# Patient Record
Sex: FEMALE | Race: WHITE | NOT HISPANIC OR LATINO | URBAN - METROPOLITAN AREA
[De-identification: names, ages, dates, MRNs, and addresses within clinical notes are randomized per-mention and may not be internally consistent; named-entity substitution may affect disease eponyms.]

---

## 2017-01-03 ENCOUNTER — CHARTING TRANS (OUTPATIENT)
Dept: URGENT CARE | Age: 52
End: 2017-01-03

## 2017-01-03 ASSESSMENT — PAIN SCALES - GENERAL: PAINLEVEL_OUTOF10: 5

## 2017-01-04 ENCOUNTER — CHARTING TRANS (OUTPATIENT)
Dept: OTHER | Age: 52
End: 2017-01-04

## 2017-05-27 ENCOUNTER — LAB SERVICES (OUTPATIENT)
Dept: OTHER | Age: 52
End: 2017-05-27

## 2017-05-27 ENCOUNTER — CHARTING TRANS (OUTPATIENT)
Dept: URGENT CARE | Age: 52
End: 2017-05-27

## 2017-05-27 LAB
CLARITY: ABNORMAL
COLOR: ABNORMAL
SPECIFIC GRAVITY URINE: >=1.03 (ref 1–1.03)

## 2017-05-30 LAB — FINAL REPORT: NORMAL

## 2018-01-24 ENCOUNTER — PRIOR ORIGINAL RECORDS (OUTPATIENT)
Dept: OTHER | Age: 53
End: 2018-01-24

## 2018-02-14 ENCOUNTER — PRIOR ORIGINAL RECORDS (OUTPATIENT)
Dept: OTHER | Age: 53
End: 2018-02-14

## 2018-03-13 ENCOUNTER — PRIOR ORIGINAL RECORDS (OUTPATIENT)
Dept: OTHER | Age: 53
End: 2018-03-13

## 2018-03-26 ENCOUNTER — MYAURORA ACCOUNT LINK (OUTPATIENT)
Dept: OTHER | Age: 53
End: 2018-03-26

## 2018-05-14 ENCOUNTER — LAB SERVICES (OUTPATIENT)
Dept: OTHER | Age: 53
End: 2018-05-14

## 2018-05-14 ENCOUNTER — CHARTING TRANS (OUTPATIENT)
Dept: OTHER | Age: 53
End: 2018-05-14

## 2018-05-14 ENCOUNTER — ANCILLARY ORDERS (OUTPATIENT)
Dept: OTHER | Age: 53
End: 2018-05-14

## 2018-05-14 DIAGNOSIS — Z12.31 ENCOUNTER FOR SCREENING MAMMOGRAM FOR MALIGNANT NEOPLASM OF BREAST: ICD-10-CM

## 2018-05-17 LAB — AP REPORT: NORMAL

## 2018-11-28 VITALS
SYSTOLIC BLOOD PRESSURE: 106 MMHG | HEART RATE: 70 BPM | TEMPERATURE: 98.8 F | DIASTOLIC BLOOD PRESSURE: 70 MMHG | OXYGEN SATURATION: 98 % | RESPIRATION RATE: 16 BRPM

## 2018-11-29 VITALS
RESPIRATION RATE: 20 BRPM | SYSTOLIC BLOOD PRESSURE: 118 MMHG | HEART RATE: 84 BPM | TEMPERATURE: 99.1 F | OXYGEN SATURATION: 99 % | DIASTOLIC BLOOD PRESSURE: 80 MMHG

## 2019-02-28 VITALS
SYSTOLIC BLOOD PRESSURE: 102 MMHG | RESPIRATION RATE: 16 BRPM | BODY MASS INDEX: 18.51 KG/M2 | HEART RATE: 66 BPM | DIASTOLIC BLOOD PRESSURE: 60 MMHG | HEIGHT: 69 IN | WEIGHT: 125 LBS

## 2019-03-06 VITALS
DIASTOLIC BLOOD PRESSURE: 80 MMHG | SYSTOLIC BLOOD PRESSURE: 110 MMHG | BODY MASS INDEX: 18.66 KG/M2 | WEIGHT: 126 LBS | HEIGHT: 69 IN

## 2019-06-13 ENCOUNTER — WALK IN (OUTPATIENT)
Dept: URGENT CARE | Age: 54
End: 2019-06-13

## 2019-06-13 VITALS
TEMPERATURE: 97.5 F | DIASTOLIC BLOOD PRESSURE: 70 MMHG | OXYGEN SATURATION: 98 % | RESPIRATION RATE: 16 BRPM | HEART RATE: 64 BPM | SYSTOLIC BLOOD PRESSURE: 108 MMHG

## 2019-06-13 DIAGNOSIS — K13.0 LIP LESION: Primary | ICD-10-CM

## 2019-06-13 PROCEDURE — 99213 OFFICE O/P EST LOW 20 MIN: CPT | Performed by: FAMILY MEDICINE

## 2019-06-13 RX ORDER — CITALOPRAM 20 MG/1
20 TABLET ORAL
COMMUNITY
Start: 2019-02-11 | End: 2022-04-28 | Stop reason: ALTCHOICE

## 2019-06-13 RX ORDER — CEPHALEXIN 500 MG/1
500 CAPSULE ORAL 3 TIMES DAILY
Qty: 30 CAPSULE | Refills: 0 | Status: SHIPPED | OUTPATIENT
Start: 2019-06-13 | End: 2019-06-23

## 2019-10-14 ENCOUNTER — WALK IN (OUTPATIENT)
Dept: URGENT CARE | Age: 54
End: 2019-10-14

## 2019-10-14 VITALS
HEART RATE: 75 BPM | DIASTOLIC BLOOD PRESSURE: 60 MMHG | SYSTOLIC BLOOD PRESSURE: 104 MMHG | OXYGEN SATURATION: 98 % | RESPIRATION RATE: 16 BRPM | TEMPERATURE: 98.9 F

## 2019-10-14 DIAGNOSIS — R30.0 DYSURIA: Primary | ICD-10-CM

## 2019-10-14 LAB
BACTERIA: ABNORMAL
BILIRUBIN URINE: NEGATIVE
BLOOD URINE: ABNORMAL
CLARITY: ABNORMAL
COLOR: YELLOW
GLUCOSE QUALITATIVE U: NEGATIVE
KETONES, URINE: NEGATIVE
LEUKOCYTE ESTERASE URINE: NEGATIVE
NITRITE URINE: NEGATIVE
PH URINE: 5 (ref 5–7)
RED BLOOD CELLS URINE: ABNORMAL (ref 0–3)
SPECIFIC GRAVITY URINE: >=1.03 (ref 1–1.03)
SQUAMOUS EPITHELIAL CELLS: ABNORMAL
URINE PROTEIN, QUAL (DIPSTICK): NEGATIVE
UROBILINOGEN URINE: 0.2
WHITE BLOOD CELLS URINE: ABNORMAL (ref 0–5)
YEAST: ABNORMAL

## 2019-10-14 PROCEDURE — 99214 OFFICE O/P EST MOD 30 MIN: CPT | Performed by: FAMILY MEDICINE

## 2019-10-14 PROCEDURE — 81003 URINALYSIS AUTO W/O SCOPE: CPT | Performed by: FAMILY MEDICINE

## 2019-10-14 RX ORDER — NITROFURANTOIN 25; 75 MG/1; MG/1
100 CAPSULE ORAL 2 TIMES DAILY
Qty: 14 CAPSULE | Refills: 0 | Status: SHIPPED | OUTPATIENT
Start: 2019-10-14 | End: 2019-10-21

## 2022-04-28 ENCOUNTER — OFFICE VISIT (OUTPATIENT)
Dept: OBGYN | Age: 57
End: 2022-04-28

## 2022-04-28 VITALS
DIASTOLIC BLOOD PRESSURE: 66 MMHG | RESPIRATION RATE: 16 BRPM | BODY MASS INDEX: 19.44 KG/M2 | TEMPERATURE: 97.8 F | WEIGHT: 128.25 LBS | HEIGHT: 68 IN | SYSTOLIC BLOOD PRESSURE: 110 MMHG

## 2022-04-28 DIAGNOSIS — Z11.51 SCREENING FOR HUMAN PAPILLOMAVIRUS (HPV): ICD-10-CM

## 2022-04-28 DIAGNOSIS — Z12.31 ENCOUNTER FOR SCREENING MAMMOGRAM FOR MALIGNANT NEOPLASM OF BREAST: ICD-10-CM

## 2022-04-28 DIAGNOSIS — Z01.419 ENCOUNTER FOR GYNECOLOGICAL EXAMINATION WITHOUT ABNORMAL FINDING: Primary | ICD-10-CM

## 2022-04-28 PROCEDURE — 87624 HPV HI-RISK TYP POOLED RSLT: CPT | Performed by: OBSTETRICS & GYNECOLOGY

## 2022-04-28 PROCEDURE — 99386 PREV VISIT NEW AGE 40-64: CPT | Performed by: OBSTETRICS & GYNECOLOGY

## 2022-04-28 PROCEDURE — 87624 HPV HI-RISK TYP POOLED RSLT: CPT | Performed by: CLINICAL MEDICAL LABORATORY

## 2022-04-28 PROCEDURE — 88142 CYTOPATH C/V THIN LAYER: CPT | Performed by: PATHOLOGY

## 2022-04-28 PROCEDURE — PSEU9939 HPV, HIGH RISK: Performed by: CLINICAL MEDICAL LABORATORY

## 2022-05-04 LAB — AP REPORT: NORMAL

## 2022-05-05 ENCOUNTER — LAB REQUISITION (OUTPATIENT)
Dept: LAB | Age: 57
End: 2022-05-05

## 2022-05-05 DIAGNOSIS — Z01.419 ENCOUNTER FOR GYNECOLOGICAL EXAMINATION (GENERAL) (ROUTINE) WITHOUT ABNORMAL FINDINGS: ICD-10-CM

## 2022-05-05 DIAGNOSIS — Z12.31 ENCOUNTER FOR SCREENING MAMMOGRAM FOR MALIGNANT NEOPLASM OF BREAST: ICD-10-CM

## 2022-05-05 DIAGNOSIS — Z11.51 ENCOUNTER FOR SCREENING FOR HUMAN PAPILLOMAVIRUS (HPV): ICD-10-CM

## 2022-05-06 LAB
HPV16+18+45 E6+E7MRNA CVX NAA+PROBE: NEGATIVE
HPV16+18+45 E6+E7MRNA CVX NAA+PROBE: NEGATIVE
Lab: NORMAL
Lab: NORMAL

## 2022-05-13 ENCOUNTER — APPOINTMENT (OUTPATIENT)
Dept: MAMMOGRAPHY | Age: 57
End: 2022-05-13
Attending: OBSTETRICS & GYNECOLOGY

## 2023-10-12 ENCOUNTER — TELEPHONE (OUTPATIENT)
Dept: FAMILY MEDICINE CLINIC | Facility: CLINIC | Age: 58
End: 2023-10-12

## 2023-10-12 NOTE — TELEPHONE ENCOUNTER
PT CALLED AND WOULD LIKE TO KNOW IF DR HANNA TO TAKE ON As NEW PT - PTS SPOUSE IS A PT OF DR BECKER'S (BROCK PEARSON)    PLEASE ADV    THANK YOU

## 2023-10-16 NOTE — TELEPHONE ENCOUNTER
I can take her on , just so she is aware , that at some point I will be retiring and may need to be reassigned to someone , so rather than interupt her care, might be better to get established with that person now, rhather than start all over

## 2024-01-11 NOTE — PROGRESS NOTES
Dary Ochoa is a 58 year old female.    Chief Complaint   Patient presents with    Physical       HPI:   Patient is here for wellness visit.  She has been working in a warehouse for several years and finds that her anxiety has got worse.  She used to be on Lexapro but stopped using it but since this new job she is struggling with her anxiety.  She is now undergoing counseling but would like to go back on medication.  She understands that she may need to stay on this for a while while she works on cognitive behavioral therapy.    Patient Active Problem List   Diagnosis    Actinic keratosis    Neoplasm of uncertain behavior of skin    Tanning arriaga use    Dysplastic nevus of trunk    Irritated nevus    History of dysplastic nevus    Generalized anxiety disorder     Current Outpatient Medications   Medication Sig Dispense Refill    escitalopram (LEXAPRO) 5 MG Oral Tab Take 1 tablet (5 mg total) by mouth daily. 30 tablet 2      Past Medical History:   Diagnosis Date    Acute cystitis without hematuria 04/10/2020    Dysplastic nevus 01/01/2016    mid back, mild dysplasia    Dysplastic nevus 01/01/2016    lower chest, mild dysplasia      Social History:  Social History     Socioeconomic History    Marital status:    Tobacco Use    Smoking status: Never    Smokeless tobacco: Never   Vaping Use    Vaping Use: Never used   Substance and Sexual Activity    Alcohol use: Not Currently     Comment: social    Drug use: Never   Other Topics Concern    Pt has a pacemaker No    Pt has a defibrillator No    Reaction to local anesthetic No     Family History   Problem Relation Age of Onset    Diabetes Father         Allergies  No Known Allergies    REVIEW OF SYSTEMS:   As per HPI all other systems are negative    EXAM:   /62   Pulse 61   Temp 99.2 °F (37.3 °C) (Temporal)   Ht 5' 8.5\" (1.74 m)   Wt 131 lb 9.6 oz (59.7 kg)   LMP 02/05/2016   SpO2 94%   BMI 19.72 kg/m²   GENERAL: well developed, well  nourished,in no apparent distress  SKIN: no rashes,no suspicious lesions  HEENT: atraumatic, normocephalic,ears and throat are clear  NECK: supple,no adenopathy, normal thyroid, no nodules  BREASTS: bilateral implants in place. no nipple abnormality or discharge; no axillary or supraclavicular lymph nodes palpable; no skin changes  LUNGS: normal rate without respiratory distress, lungs clear to auscultation  CARDIO: RRR without murmur, no edema  GI: normal bowel sounds, no masses, no hepatosplenomegaly, or tenderness  MUSCULOSKELETAL: no edema, normal strength and tone  NEURO:no gross notable deficiencies no notable sensory deficits  PSYCH: alert and oriented x 3 well-groomed, good eye contact, bright affect.    ASSESSMENT AND PLAN:     Encounter Diagnoses   Name Primary?    Well adult exam Yes    Visit for screening mammogram     Post-menopausal     DENIZ (generalized anxiety disorder)        Orders Placed This Encounter   Procedures    CBC With Differential With Platelet    Comp Metabolic Panel (14)    TSH and Free T4    Lipid Panel       Meds & Refills for this Visit:  Requested Prescriptions     Signed Prescriptions Disp Refills    escitalopram (LEXAPRO) 5 MG Oral Tab 30 tablet 2     Sig: Take 1 tablet (5 mg total) by mouth daily.       Imaging & Consults:  [facility] LAZARO 2D+3D SCREENING BILAT (CPT=77067/27409)  XR DEXA BONE DENSITOMETRY (CPT=77080)    No follow-ups on file.  There are no Patient Instructions on file for this visit.

## 2024-02-08 NOTE — TELEPHONE ENCOUNTER
Pt was seen by Dr Babcock on Jan 11, 24, she has a form that her company needs filled out. Pt will drop it off.

## 2024-02-12 NOTE — TELEPHONE ENCOUNTER
Lab Frequency Next Occurrence   3D Mammogram Digital Screen, Bilateral (CPT=77067/56573) Once 01/11/2024   XR DEXA BONE DENSITOMETRY (CPT=77080) Once 01/11/2024     Letter mailed to patient reminding them they have outstanding orders.     [employer] mailed

## 2024-02-13 NOTE — TELEPHONE ENCOUNTER
I do not recall he discussing this with me. I am happy to fill this but she will need to her psychologist determine how much time she needs off and inform me of this or I can send her to the psychiatrist who can then fill the form him/herself. Thank you.

## 2024-02-13 NOTE — TELEPHONE ENCOUNTER
Delano Ernandez MD Jeganmohan, Janandana Nurse44 minutes ago (10:50 AM)     Needs visit to fill this.     No future appointments.    Left detailed message to voicemail (per verbal release form consent with confirmed identifying message) of Dr. Babcock's note above. Patient was advised to call office back - needs to schedule appt

## 2024-02-13 NOTE — TELEPHONE ENCOUNTER
Pt called and said she had a visit at the end of jan to do this. She just did not have the form with her at the time

## 2024-02-13 NOTE — TELEPHONE ENCOUNTER
Received forms from pt regarding \"Employee Accomodation Medical Questionnaire\"    Need to fax to #[phone]    Dr. Babcock to review  Please advise, thank you

## 2024-02-14 NOTE — TELEPHONE ENCOUNTER
Left message to voicemail (per verbal release form consent, NO identifying message to confirm.)  Advised patient to call office back [phone] - need to discuss note below.

## 2024-02-14 NOTE — TELEPHONE ENCOUNTER
Advised patient of Dr. Babcock's note below. Patient verbalized understanding.    Pt reports she does not need time off  Pt is on anxiety medication and needs paperwork completed for restrictions    Pt reports she discussed with Dr. Babcock that work has been stressful, but was going to be changing positions  Pt ended up NOT changing positions    Please advise, thank you       Hb-SS disease with acute chest syndrome

## 2024-02-14 NOTE — TELEPHONE ENCOUNTER
Delano Enrandez MD   to Delano Ernandez Nurse     2/14/24 12:50 PM  I don't know what accommodations she needs. Please have her schedule video visit. Thank you      Left detailed message to voicemail (per verbal release form consent with confirmed identifying message) of Dr. Babcock's note above. Patient was advised to call office back - needs to schedule appt with Dr. Babcock

## 2024-02-16 NOTE — PROGRESS NOTES
Virtual Telephone Check-In    Dary Ochoa verbally consents to a Virtual/Telephone Check-In visit on 02/16/24.  Patient has been referred to the [facility] website at [email or web address] to review the yearly Consent to Treat document.    Patient understands and accepts financial responsibility for any deductible, co-insurance and/or co-pays associated with this service.    Duration of the service: 15 minutes      Summary of topics discussed: Patient requires employee accommodation paperwork completed for her allowing her to use partially do her job functions for 4 hours a day versus 8 hours a day due to anxiety.  Explained to patient that anxiety related job restriction needs to be confirmed by a clinical psychologist/psychiatrist in order to provide evidence for work restriction and she voices understanding agreement and will discuss this with her counselor and have this document provided for me.  Once provided forms will be completed.  Patient has no other questions at this time      Problem List Items Addressed This Visit    None  Visit Diagnoses       Encounter for completion of form with patient    -  Primary    Anxiety                  Delano Ernandez MD

## 2024-03-29 NOTE — TELEPHONE ENCOUNTER
Patient states she is feeling sluggish and was wondering if she should have additional labs like iron, vit D or anything else Dr Babcock would recommend.

## 2024-04-01 NOTE — TELEPHONE ENCOUNTER
Lab order placed for [facility] - unable to add on to existing specimen      Please have patient do video visit to discuss her recent blood work and her fatigue further.  Thank you   Written by Delano Ernandez MD on 3/30/2024 11:03 PM CDT    Advised patient of Dr. Babcock's note above and below. Patient verbalized understanding.   Offered to schedule Video visit - pt v/u and stated will download [employer] carlos - pt to call office back to schedule VV appt with Dr. Babcock to discuss lab results and pt's fatigue - pt v/u. No further questions at this time.    Proxible code sent to pt's preferred ph#

## 2024-04-05 NOTE — PROGRESS NOTES
Virtual Telephone Check-In    Dary Ochoa verbally consents to a Virtual/Telephone Check-In visit on 04/05/24.  Patient has been referred to the [facility] website at [email or web address] to review the yearly Consent to Treat document.    Patient understands and accepts financial responsibility for any deductible, co-insurance and/or co-pays associated with this service.    Duration of the service: 10 minutes  Patient's name and date of birth was verified prior to commencement of visit    Summary of topics discussed:     Hypercholesterolemia      Diagnoses and all orders for this visit:    Screening, heart disease, ischemic  -     CT CALCIUM SCORING; Future    Hypercholesteremia    Patient was counseled on diet and exercise to try and help improve this condition.    She was advised that if the heart scan comes back abnormal consider medication intervention to prevent progression of coronary disease.     Delano Ernandez MD

## 2024-05-09 ENCOUNTER — WALK IN (OUTPATIENT)
Dept: URGENT CARE | Age: 59
End: 2024-05-09

## 2024-05-09 VITALS
RESPIRATION RATE: 14 BRPM | OXYGEN SATURATION: 99 % | TEMPERATURE: 97.7 F | DIASTOLIC BLOOD PRESSURE: 73 MMHG | HEART RATE: 65 BPM | SYSTOLIC BLOOD PRESSURE: 115 MMHG

## 2024-05-09 DIAGNOSIS — J32.9 SINUSITIS, UNSPECIFIED CHRONICITY, UNSPECIFIED LOCATION: Primary | ICD-10-CM

## 2024-05-09 PROCEDURE — 99204 OFFICE O/P NEW MOD 45 MIN: CPT | Performed by: FAMILY MEDICINE

## 2024-05-09 RX ORDER — CEFUROXIME AXETIL 500 MG/1
500 TABLET ORAL 2 TIMES DAILY
Qty: 14 TABLET | Refills: 0 | Status: SHIPPED | OUTPATIENT
Start: 2024-05-09 | End: 2024-05-16

## 2024-05-09 RX ORDER — PREDNISONE 20 MG/1
40 TABLET ORAL DAILY
Qty: 10 TABLET | Refills: 0 | Status: SHIPPED | OUTPATIENT
Start: 2024-05-09 | End: 2024-05-14

## 2024-05-09 ASSESSMENT — ENCOUNTER SYMPTOMS: HEADACHES: 1

## 2024-05-23 NOTE — PROGRESS NOTES
Date of Service 5/22/2024    CEFERINO PEARSON  Date of Birth 10/21/1965    Patient Age: 58 year old    PCP: Delano Ernandez MD  [address] [facility] [address]    Heart Scan Consult  Preliminary Heart Scan Score: 0    Previous Screening  Heart Scan Completed Previously: No        Peripheral Vascular Scan Completed Previously: No          Risk Factors  Personal Risk Factors  Non-alterable Risk Factors: Family History;Gender;Age (Reports, Father had a Heart attack in his 50s)  Alterable Risk Factors: Abnormal Cholesterol;Stress      Body Mass Index  There is no height or weight on file to calculate BMI.    Blood Pressure    Reports having low blood pressure.    (Normal =< 120/80,  Elevated = 120-129/ >80,  High Stage1 130-139/80-89 , Stage2 >140/>90)    Lipid Profile  Cholesterol: 223, done on 3/29/2024.  HDL Cholesterol: 67, done on 3/29/2024.  LDL Cholesterol: 138, done on 3/29/2024.  TriGlycerides 82, done on 3/29/2024.    Cholesterol Goals  Value   Total  =< 200   HDL  = > 45 Men = > 55 Women   LDL   =< 100   Triglycerides  =< 150       Glucose and Hemoglobin A1C  Lab Results   Component Value Date    GLU 85 03/29/2024     (Normal Fasting Glucose < 100mg/dl )    Nurse Review  Risk factor information and results reviewed with Nurse: Yes    Recommended Follow Up:  Consult your physician regarding:: Final Heart Scan Report;Discuss potential for Incidental Finding;Discuss Potential for Score Variance      Recommendations for Change:  Nutrition Changes: Low Saturated Fat;Low Fat Dairy;Low Salt Eating;Increase Fiber    Cholesterol Modification (goal of therapy depends upon your risk): Decrease LDL (Lousy/Bad) Ideal <100;Decrease Total Normal <200                   Stress Management: Adopt Stress Management Techniques    Repeat Heart Scan: 5 years if Calcium Score is 0.0;Discuss with your Physician              [place] Recommended Resources:  Recommended Resources: Upcoming Classes, Medical  Services and Health Library [email or web address]     Other Resources:: Handouts given - Controlling Risk Factors, Cholesterol, and Stress      Marissa DANG RN        Please Contact the Nurse Heart Line with any Questions or Concerns [phone].

## (undated) NOTE — LETTER
Dary Ochoa   [address] [place] [id number]           Dear Dary Ochoa     Our records indicate that you have outstanding lab work and or testing that was ordered for you and has not yet been completed:  Lab Frequency Next Occurrence   3D Mammogram Digital Screen, Bilateral (CPT=77067/51301) Once 01/11/2024   XR DEXA BONE DENSITOMETRY (CPT=77080) Once 01/11/2024    [facility] orders enclosed   To provide you with the best possible care, please complete these orders at your earliest convenience. If you have recently completed these orders please disregard this letter.     If you have any questions please call the office at [phone].     Thank you,     [facility]